# Patient Record
Sex: MALE | Race: WHITE | Employment: OTHER | URBAN - METROPOLITAN AREA
[De-identification: names, ages, dates, MRNs, and addresses within clinical notes are randomized per-mention and may not be internally consistent; named-entity substitution may affect disease eponyms.]

---

## 2017-04-03 ENCOUNTER — APPOINTMENT (OUTPATIENT)
Dept: GENERAL RADIOLOGY | Age: 67
End: 2017-04-03
Attending: NURSE PRACTITIONER
Payer: MEDICARE

## 2017-04-03 ENCOUNTER — HOSPITAL ENCOUNTER (EMERGENCY)
Age: 67
Discharge: HOME | End: 2017-04-03
Attending: EMERGENCY MEDICINE
Payer: MEDICARE

## 2017-04-03 ENCOUNTER — APPOINTMENT (OUTPATIENT)
Dept: CT IMAGING | Age: 67
End: 2017-04-03
Attending: NURSE PRACTITIONER
Payer: MEDICARE

## 2017-04-03 VITALS
HEART RATE: 105 BPM | TEMPERATURE: 98 F | RESPIRATION RATE: 16 BRPM | BODY MASS INDEX: 28.88 KG/M2 | OXYGEN SATURATION: 95 % | SYSTOLIC BLOOD PRESSURE: 171 MMHG | HEIGHT: 74 IN | DIASTOLIC BLOOD PRESSURE: 95 MMHG | WEIGHT: 225 LBS

## 2017-04-03 DIAGNOSIS — S32.000A COMPRESSION FRACTURE OF LUMBAR VERTEBRA, CLOSED, INITIAL ENCOUNTER (HCC): Primary | ICD-10-CM

## 2017-04-03 LAB
BACTERIA URNS QL MICRO: 0 /HPF
CASTS URNS QL MICRO: NORMAL /LPF
CRYSTALS URNS QL MICRO: 0 /LPF
EPI CELLS #/AREA URNS HPF: 0 /HPF
MUCOUS THREADS URNS QL MICRO: NORMAL /LPF
OTHER OBSERVATIONS,UCOM: NORMAL
RBC #/AREA URNS HPF: NORMAL /HPF
WBC URNS QL MICRO: NORMAL /HPF

## 2017-04-03 PROCEDURE — 72131 CT LUMBAR SPINE W/O DYE: CPT

## 2017-04-03 PROCEDURE — 99285 EMERGENCY DEPT VISIT HI MDM: CPT | Performed by: NURSE PRACTITIONER

## 2017-04-03 PROCEDURE — 81015 MICROSCOPIC EXAM OF URINE: CPT | Performed by: NURSE PRACTITIONER

## 2017-04-03 PROCEDURE — 72100 X-RAY EXAM L-S SPINE 2/3 VWS: CPT

## 2017-04-03 PROCEDURE — 74011250637 HC RX REV CODE- 250/637: Performed by: NURSE PRACTITIONER

## 2017-04-03 PROCEDURE — 81003 URINALYSIS AUTO W/O SCOPE: CPT | Performed by: NURSE PRACTITIONER

## 2017-04-03 RX ORDER — HYDROCODONE BITARTRATE AND ACETAMINOPHEN 5; 325 MG/1; MG/1
1 TABLET ORAL
Status: COMPLETED | OUTPATIENT
Start: 2017-04-03 | End: 2017-04-03

## 2017-04-03 RX ORDER — ATENOLOL 50 MG/1
50 TABLET ORAL 2 TIMES DAILY
COMMUNITY

## 2017-04-03 RX ORDER — ATORVASTATIN CALCIUM 10 MG/1
10 TABLET, FILM COATED ORAL DAILY
COMMUNITY

## 2017-04-03 RX ORDER — HYDROCODONE BITARTRATE AND ACETAMINOPHEN 5; 325 MG/1; MG/1
1 TABLET ORAL
Qty: 20 TAB | Refills: 0 | Status: SHIPPED | OUTPATIENT
Start: 2017-04-03 | End: 2017-04-27 | Stop reason: SDUPTHER

## 2017-04-03 RX ADMIN — HYDROCODONE BITARTRATE AND ACETAMINOPHEN 1 TABLET: 5; 325 TABLET ORAL at 14:00

## 2017-04-03 NOTE — DISCHARGE INSTRUCTIONS
Compression Fracture of the Spine: Care Instructions  Your Care Instructions    A compression fracture happens when the front part of a spinal bone breaks and collapses. A fall or other accident can cause it. A minor injury or moving the wrong way can cause a break if you have thin or brittle bones (osteoporosis). These types of breaks will heal in 8 to 10 weeks. You will need rest and pain medicines. Your doctor may recommend physical therapy. Some doctors recommend that certain people with compression fractures wear braces. Your doctor also may treat thin or brittle bones. You may need surgery if you have lasting pain or if the bone presses on the spinal cord or nerves. You heal best when you take good care of yourself. Eat a variety of healthy foods, and don't smoke. Follow-up care is a key part of your treatment and safety. Be sure to make and go to all appointments, and call your doctor if you are having problems. It's also a good idea to know your test results and keep a list of the medicines you take. How can you care for yourself at home? · Be safe with medicines. Read and follow all instructions on the label. ¨ If the doctor gave you a prescription medicine for pain, take it as prescribed. ¨ If you are not taking a prescription pain medicine, ask your doctor if you can take an over-the-counter medicine. · Talk to your doctor about how to make your bones stronger. You may need medicines or a change in what you eat. · Be active only as directed by your doctor. When should you call for help? Call 911 anytime you think you may need emergency care. For example, call if:  · You are unable to move an arm or a leg at all. Call your doctor now or seek immediate medical care if:  · You have new or worse symptoms in your arms, chest, legs, belly, or buttocks. Symptoms may include:  ¨ Numbness or tingling. ¨ Weakness. ¨ Pain. · You lose bladder or bowel control.   · You have belly pain, bloating, vomiting, or nausea. Watch closely for changes in your health, and be sure to contact your doctor if:  · You are not getting better as expected. Where can you learn more? Go to http://valentine-bijan.info/. Enter P445 in the search box to learn more about \"Compression Fracture of the Spine: Care Instructions. \"  Current as of: August 10, 2016  Content Version: 11.2  © 6681-6813 [employer]. Care instructions adapted under license by [employer] (which disclaims liability or warranty for this information). If you have questions about a medical condition or this instruction, always ask your healthcare professional. [address] any warranty or liability for your use of this information.

## 2017-04-03 NOTE — ED PROVIDER NOTES
HPI Comments: Patient presents with lower back pain that radiates into bilateral legs. He states pain started after he fell 2 weeks ago and landed on his buttocks. He states he has minimal problems with urinary retention. He states he has been constipated the past few days. He states he has not gotten out of the bed due to pain, but his daughter made him come to the ED today. He states he has been taking ibuprofen and tramadol which has not helped with pain. Patient is walking with a walker today due to pain. Patient is a 74 y.o. male presenting with back pain. The history is provided by the patient. Back Pain    This is a new problem. The current episode started more than 1 week ago. The problem has not changed since onset. The problem occurs constantly. Patient reports not work related injury. The pain is associated with walking. The pain is present in the lumbar spine. The quality of the pain is described as sharp. The pain radiates to the left thigh, left knee, right thigh and right knee. The pain is at a severity of 8/10. The pain is moderate. The symptoms are aggravated by certain positions. Associated symptoms include leg pain and weakness. Pertinent negatives include no chest pain, no fever, no numbness, no weight loss, no headaches, no abdominal pain, no abdominal swelling, no bowel incontinence, no perianal numbness, no bladder incontinence, no dysuria, no pelvic pain, no paresthesias, no paresis and no tingling. He has tried NSAIDs for the symptoms. Past Medical History:   Diagnosis Date    CAD (coronary artery disease)     Hypertension        History reviewed. No pertinent surgical history. History reviewed. No pertinent family history. Social History     Social History    Marital status: UNKNOWN     Spouse name: N/A    Number of children: N/A    Years of education: N/A     Occupational History    Not on file.      Social History Main Topics    Smoking status: Current Every Day Smoker    Smokeless tobacco: Not on file    Alcohol use Yes    Drug use: Not on file    Sexual activity: Not on file     Other Topics Concern    Not on file     Social History Narrative    No narrative on file         ALLERGIES: Review of patient's allergies indicates no known allergies. Review of Systems   Constitutional: Negative for fever and weight loss. Cardiovascular: Negative for chest pain. Gastrointestinal: Positive for constipation. Negative for abdominal pain, bowel incontinence, diarrhea, nausea and vomiting. Genitourinary: Positive for difficulty urinating. Negative for bladder incontinence, dysuria and pelvic pain. Musculoskeletal: Positive for back pain. Neurological: Positive for weakness. Negative for tingling, numbness, headaches and paresthesias. Visit Vitals    BP (!) 171/95 (BP 1 Location: Left arm, BP Patient Position: At rest)    Pulse (!) 105    Temp 98 °F (36.7 °C)    Resp 16    Ht 6' 2\" (1.88 m)    Wt 102.1 kg (225 lb)    SpO2 95%    BMI 28.89 kg/m2         Physical Exam   Constitutional: He is oriented to person, place, and time. He appears well-developed and well-nourished. No distress. Neck: Normal range of motion. Neck supple. Cardiovascular: Normal rate and regular rhythm. No murmur heard. Pulmonary/Chest: Effort normal and breath sounds normal. No respiratory distress. Abdominal: Soft. Normal appearance and bowel sounds are normal. He exhibits no distension. There is tenderness in the left upper quadrant. Musculoskeletal:        Thoracic back: Normal.        Lumbar back: He exhibits tenderness and pain. He exhibits normal pulse. Right upper leg: He exhibits tenderness. Left upper leg: He exhibits tenderness. Neurological: He is alert and oriented to person, place, and time. He has normal reflexes. He displays normal reflexes. No cranial nerve deficit. He exhibits normal muscle tone.  Coordination normal.   Skin: Skin is warm and dry. No rash noted. He is not diaphoretic. No erythema. Psychiatric: He has a normal mood and affect. His behavior is normal.   Nursing note and vitals reviewed. 5:53 PM-neurosurgery paged    6:14 PM-spoke with Dr. Barbara Corbett with neurosurgery. Patient is to be discharged home to follow up with Dr. Pavel Varela. Recent Results (from the past 12 hour(s))   URINE MICROSCOPIC    Collection Time: 04/03/17  2:51 PM   Result Value Ref Range    WBC 0-3 0 /hpf    RBC 0-3 0 /hpf    Epithelial cells 0 0 /hpf    Bacteria 0 0 /hpf    Casts 0-3 0 /lpf    Crystals 0 0 /LPF    Mucus TRACE 0 /lpf    Other observations RESULTS VERIFIED MANUALLY       CT SPINE LUMB WO CONT (Final result) Result time: 04/03/17 17:42:03     Final result by Eder Fry MD (04/03/17 17:42:03)     Impression:     IMPRESSION:  Acute L1 compression fracture.       Narrative:     CT LUMBAR SPINE WITHOUT CONTRAST. HISTORY: L1 compression fracture following fall 2012 days prior. TECHNIQUE: 3 mm axial scans from the pedicles of T12 into the sacrum with  sagittal and coronal reconstructions. Radiation dose reduction techniques were  used for this study.  Our CT scanners use one or more of the following:   Automated exposure control, adjustment of the mA and or kV according to patient  size, iterative reconstruction. COMPARISON:  Today's plain films reviewed. FINDINGS: Mackenzie Fisher is an acute L1 compression fracture. Posterior elements are  intact. No canal compromise. 60% of vertebral body height loss anteriorly. Included portion of the lung bases are clear. Included portion of the  retroperitoneum demonstrates dense aortic atherosclerosis. Acute stranding  densities anterior to the left iliopsoas inferiorly.  Urinary bladder appears  thick-walled although incompletely imaged.                 XR SPINE LUMB 2 OR 3 V (Final result) Result time: 04/03/17 14:56:13     Final result by Bailey Rodriguez MD (04/03/17 14:56:13)     Impression:   IMPRESSION: L1 compression deformity.     Narrative:     Lumbar spine    Clinical indication: 2 weeks of moderate to severe lumbar pain after fall injury      Comparison: none    Technique: 3 views    Findings: The bone density is overall low. There is a compression deformity at  L1 involving approximately 50% height loss of the mid and anterior vertebral  body. No evidence of displaced or retropulsed fragments. Vertebral heights are  otherwise maintained. There is moderate disc space narrowing at L5-S1. Small  multilevel degenerative osteophytes and mild facet arthropathic changes are  noted. There are scattered atherosclerotic calcifications of the aorta which is  tortuous. MDM  Number of Diagnoses or Management Options  Compression fracture of lumbar vertebra, closed, initial encounter: new and requires workup  Diagnosis management comments: Ct scan and xray positive for L1 compression fracture. Patient given PO norco for pain. He states pain is relieved. Patient given prescription for norco and referral for dr Bjorn Carey. Patient discharged home with family. UA negative for infection and blood.         Amount and/or Complexity of Data Reviewed  Clinical lab tests: ordered and reviewed  Tests in the radiology section of CPT®: ordered and reviewed  Tests in the medicine section of CPT®: reviewed and ordered      ED Course       Procedures

## 2017-04-03 NOTE — ED TRIAGE NOTES
Patient states he slipped and fell from 4ft, landing on his back. Patient c/o left flank, lower back pain and limited movement x 14 days.

## 2017-04-24 PROBLEM — I10 HYPERTENSION: Status: ACTIVE | Noted: 2017-04-24

## 2017-05-15 ENCOUNTER — HOSPITAL ENCOUNTER (OUTPATIENT)
Dept: PHYSICAL THERAPY | Age: 67
Discharge: HOME | End: 2017-05-15
Attending: NEUROLOGICAL SURGERY
Payer: MEDICARE

## 2017-05-15 DIAGNOSIS — S32.000A LUMBAR COMPRESSION FRACTURE, CLOSED, INITIAL ENCOUNTER (HCC): ICD-10-CM

## 2017-05-15 PROCEDURE — G8979 MOBILITY GOAL STATUS: HCPCS

## 2017-05-15 PROCEDURE — 97161 PT EVAL LOW COMPLEX 20 MIN: CPT

## 2017-05-15 PROCEDURE — G8978 MOBILITY CURRENT STATUS: HCPCS

## 2017-05-15 NOTE — PROGRESS NOTES
Ambulatory/Rehab Services H2 Model Falls Risk Assessment    Risk Factor Pts. ·   Confusion/Disorientation/Impulsivity  []    4 ·   Symptomatic Depression  []   2 ·   Altered Elimination  []   1 ·   Dizziness/Vertigo  []   1 ·   Gender (Male)  [x]   1 ·   Any administered antiepileptics (anticonvulsants):  []   2 ·   Any administered benzodiazepines:  []   1 ·   Visual Impairment (specify):  []   1 ·   Portable Oxygen Use  []   1 ·   Orthostatic ? BP  []   1 ·   History of Recent Falls (within 3 mos.)  [x]   5     Ability to Rise from Chair (choose one) Pts. ·   Ability to rise in a single movement  []   0 ·   Pushes up, successful in one attempt  [x]   1 ·   Multiple attempts, but successful  []   3 ·   Unable to rise without assistance  []   4   Total: (5 or greater = High Risk) 7     Falls Prevention Plan:   [x]                Physical Limitations to Exercise (specify): supervision and set up with exercises   []                Mobility Assistance Device (type):   [x]                Exercise/Equipment Adaptation (specify):balance training  ©2010 [facility] [address] [place] [address] States Patent #[id number].  Federal Law prohibits the replication, distribution or use without written permission from [employer] of [employer]

## 2017-05-15 NOTE — PROGRESS NOTES
Andrew Weston  : 1950 [facility]  [address], [address], [place] [address]  [phone]   [phone]        OUTPATIENT PHYSICAL THERAPY:Initial Assessment 5/15/2017    ICD-10: Treatment Diagnosis: Low back pain (M54.5)  Precautions/Allergies:   Review of patient's allergies indicates no known allergies. Fall Risk Score: 7 (? 5 = High Risk)  MD Orders: Evaluate and Treat MEDICAL/REFERRING DIAGNOSIS:  Lumbar compression fracture, closed, initial encounter [E28.744R]   DATE OF ONSET: 2017  REFERRING PHYSICIAN: Enoc Sherman MD  RETURN PHYSICIAN APPOINTMENT: none scheduled      INITIAL ASSESSMENT:  Mr. Lory Solis presents to physical therapy with an L1 compression fracture. Physical therapy evaluation demonstrates decreased strength, decreased balance, and increased SOB with mobility. Patient would benefit from skilled physical therapy to address his deficits and maximize his function. PROBLEM LIST (Impacting functional limitations):  1. Decreased Strength  2. Decreased ADL/Functional Activities  3. Decreased Ambulation Ability/Technique  4. Decreased Balance  5. Increased Pain  6. Decreased Activity Tolerance  7. Increased Shortness of Breath  8. Decreased Flexibility/Joint Mobility INTERVENTIONS PLANNED:  1. Bed Mobility  2. Cold  3. Electrical Stimulation  4. Family Education  5. Heat  6. Home Exercise Program (HEP)  7. Manual Therapy  8. Range of Motion (ROM)  9. Therapeutic Exercise/Strengthening   TREATMENT PLAN:  Effective Dates: 5-15-17 TO 8-15-17. Frequency/Duration: 1 times a week for 4 weeks  GOALS: (Goals have been discussed and agreed upon with patient.)  Short Term Goals  1. Pt will be independent with comprehensive HEP to centralize and reduce low back pain  2. Pt will participate in LE stretching program to increase flexibility  3. Pt will participate in core stabilization exercises to help with stabilization during ADLs  4.  Pt will participate in LE strengthening program with weights as appropriate to help with gait and elevations  5. Pt will participate in static and dynamic balance activities to decrease the risk for falls  6. Pt will demonstrate a 5 point decreased on the Modified Oswestry Low Back Disability Questionnaire to show improvement in function  Long Term Goals  1. Pt will demonstrate a 10 point decrease in the Modified Oswestry Low Back Disability Questionnaire to show improvement in function  2. Pt will demonstrate 5/5 LE strength on manual muscle testing  3. Pt will be able to perform SLS >5 seconds bilaterally to help with gait and improve balance  4. Pt will report <3/10 pain with sitting, standing, and walking to demonstrate improvement in function  Rehabilitation Potential For Stated Goals: Good  Regarding Lisa Lugo's therapy, I certify that the treatment plan above will be carried out by a therapist or under their direction. Thank you for this referral,  Gerry Cohn DPT     Referring Physician Signature: Enoc Sherman MD              Date                    HISTORY:   History of Present Injury/Illness (Reason for Referral):  Patient reports falling onto his butt and fractured his L1 vertebra. This occurred middle of March. Did not go to the MD for a couple of weeks. Went to the emergency room and was given X-rays which confirmed the fracture. Saw Dr. Juan Ramon Jaeger 3 weeks ago. Is non surgical. Was referred to pain management and physical therapy. Was given pain medicine. Has not been taking the pain medicine. Has been taking ibuprofen. Pain is better with lying down. Pain is worse with sitting, standing, walking. Has been wearing a lumbar corset (except for in bed). Pain is in the center of the back. Pain has been getting better. Pain is intermittent. Denies numbness/tingling. Has seen pain management. June 1st is his next visit with them and will be getting an injection.    Past Medical History/Comorbidities: Mr. Rohith Harrington  has a past medical history of CAD (coronary artery disease); Eczema (10/15/2015); Edema (2/2/2016); Hypercholesterolemia; and Hypertension. Mr. Rohith Harrington  has no past surgical history on file. Social History/Living Environment:    Live with wife. Two story home with walk in shower. Has been very careful with all ADLs. Does not exercise  Prior Level of Function/Work/Activity:  Retired. Dominant Side:         RIGHT  Other Clinical Tests:          X-rays, MRI- confirmed fracture      Current Medications:    Current Outpatient Prescriptions:     HYDROcodone-acetaminophen (NORCO) 5-325 mg per tablet, Take 2 Tabs by mouth every six (6) hours as needed for Pain. Max Daily Amount: 8 Tabs., Disp: 50 Tab, Rfl: 0    atenolol (TENORMIN) 25 mg tablet, Take 25 mg by mouth daily. , Disp: , Rfl:     lisinopril (PRINIVIL, ZESTRIL) 10 mg tablet, Take  by mouth daily. , Disp: , Rfl:     ibuprofen (MOTRIN) 600 mg tablet, Take  by mouth every six (6) hours as needed for Pain., Disp: , Rfl:     atenolol (TENORMIN) 50 mg tablet, Take 50 mg by mouth two (2) times a day. Indications: 50mg q am and 25mg q hs, Disp: , Rfl:     atorvastatin (LIPITOR) 10 mg tablet, Take 10 mg by mouth daily. , Disp: , Rfl:      Date Last Reviewed:  5/15/2017    Number of Personal Factors/Comorbidities that affect the Plan of Care: 0: LOW COMPLEXITY   EXAMINATION:   Observation/Orthostatic Postural Assessment:          Patient with lumbar corset. In sitting patient with slouched posture.  Decreased lumbar lordosis  Palpation:         Pain over L1  ROM:    Did not test lumbar ROM due to compression fracture  Hip ROM limited IR    Strength:     LE:  Hip Flexion 4/5 B  Hip Extension 4/5 B  Hip Abduction 4/5 B  Knee Flexion 4/5 B  Knee Extension 4/5 B  Ankle Dorsiflexion 5/5  Ankle Plantarflexion 5/5  Special Tests:     n/a  Neurological Screen:        Myotomes:  intact        Dermatomes:  intact  Functional Mobility:         Gait/Ambulation: Patient ambulates with increased trunk sway  Balance:          Single Leg balance- less than 2 seconds on R and L   Body Structures Involved:  1. Bones  2. Muscles Body Functions Affected:  1. Neuromusculoskeletal  2. Movement Related Activities and Participation Affected:  1. General Tasks and Demands  2. Mobility  3. Self Care  4. Domestic Life   Number of elements that affect the Plan of Care: 3: MODERATE COMPLEXITY   CLINICAL PRESENTATION:   Presentation: Stable and uncomplicated: LOW COMPLEXITY   CLINICAL DECISION MAKING:   Outcome Measure: Tool Used: Modified Oswestry Low Back Pain Questionnaire  Score:  Initial: 23/50  Most Recent: X/50 (Date: -- )   Interpretation of Score: Each section is scored on a 0-5 scale, 5 representing the greatest disability. The scores of each section are added together for a total score of 50. Score 0 1-10 11-20 21-30 31-40 41-49 50   Modifier CH CI CJ CK CL CM CN     ? Mobility - Walking and Moving Around:     - CURRENT STATUS: CK - 40%-59% impaired, limited or restricted    - GOAL STATUS: CJ - 20%-39% impaired, limited or restricted    - D/C STATUS:  ---------------To be determined---------------    Medical Necessity:   · Patient demonstrates good rehab potential due to higher previous functional level. Reason for Services/Other Comments:  · Patient continues to require skilled intervention due to pain limitng mobility. Use of outcome tool(s) and clinical judgement create a POC that gives a: Clear prediction of patient's progress: LOW COMPLEXITY   TREATMENT:   Pre-treatment Symptoms:  n/a  (In addition to Assessment/Re-Assessment sessions the following treatments were rendered)  THERAPEUTIC EXERCISE: ( minutes):  Exercises per grid below to improve mobility and strength. Required minimal visual, verbal and manual cues to promote proper body alignment and promote proper body posture. Progressed resistance, range and repetitions as indicated. Date:  5-15-17 Date:   Date:     Activity/Exercise Parameters Parameters Parameters   Patient Education Discussed POC, spinal precautions                                               Treatment/Session Assessment:  See assessment above. · Pain: Initial:    5 Post Session:   ·   Compliance with Program/Exercises: compliant all of the time. · Recommendations/Intent for next treatment session: \"Next visit will focus on advancements to more challenging activities and reduction in assistance provided\".   Future Appointments  Date Time Provider Ashanti Vance   5/24/2017 11:15 AM JOSE Yin [facility]   5/31/2017 11:15 AM JOSE Yin   6/7/2017 11:15 AM JOSE Yin [facility]     Total Treatment Duration:  PT Patient Time In/Time Out  Time In: 4510  Time Out: [address] DPT

## 2017-05-24 ENCOUNTER — HOSPITAL ENCOUNTER (OUTPATIENT)
Dept: PHYSICAL THERAPY | Age: 67
Discharge: HOME | End: 2017-05-24
Attending: NEUROLOGICAL SURGERY
Payer: MEDICARE

## 2017-05-24 PROCEDURE — 97110 THERAPEUTIC EXERCISES: CPT

## 2017-05-24 NOTE — PROGRESS NOTES
Jarrett Reagan  : 1950 [facility]  [address], [address], [place]  [phone]   [phone]        OUTPATIENT PHYSICAL THERAPY:Daily Note 2017    ICD-10: Treatment Diagnosis: Low back pain (M54.5)  Precautions/Allergies:   Review of patient's allergies indicates no known allergies. Fall Risk Score: 7 (? 5 = High Risk)  MD Orders: Evaluate and Treat MEDICAL/REFERRING DIAGNOSIS:  Lumbar compression fracture   DATE OF ONSET: 2017  REFERRING PHYSICIAN: Ailyn Muse MD  RETURN PHYSICIAN APPOINTMENT: none scheduled      INITIAL ASSESSMENT:  Mr. Dorian Mackay presents to physical therapy with an L1 compression fracture. Physical therapy evaluation demonstrates decreased strength, decreased balance, and increased SOB with mobility. Patient would benefit from skilled physical therapy to address his deficits and maximize his function. PROBLEM LIST (Impacting functional limitations):  1. Decreased Strength  2. Decreased ADL/Functional Activities  3. Decreased Ambulation Ability/Technique  4. Decreased Balance  5. Increased Pain  6. Decreased Activity Tolerance  7. Increased Shortness of Breath  8. Decreased Flexibility/Joint Mobility INTERVENTIONS PLANNED:  1. Bed Mobility  2. Cold  3. Electrical Stimulation  4. Family Education  5. Heat  6. Home Exercise Program (HEP)  7. Manual Therapy  8. Range of Motion (ROM)  9. Therapeutic Exercise/Strengthening   TREATMENT PLAN:  Effective Dates: 5-15-17 TO 8-15-17. Frequency/Duration: 1 times a week for 4 weeks  GOALS: (Goals have been discussed and agreed upon with patient.)  Short Term Goals  1. Pt will be independent with comprehensive HEP to centralize and reduce low back pain  2. Pt will participate in LE stretching program to increase flexibility  3. Pt will participate in core stabilization exercises to help with stabilization during ADLs  4.  Pt will participate in LE strengthening program with weights as appropriate to help with gait and elevations  5. Pt will participate in static and dynamic balance activities to decrease the risk for falls  6. Pt will demonstrate a 5 point decreased on the Modified Oswestry Low Back Disability Questionnaire to show improvement in function  Long Term Goals  1. Pt will demonstrate a 10 point decrease in the Modified Oswestry Low Back Disability Questionnaire to show improvement in function  2. Pt will demonstrate 5/5 LE strength on manual muscle testing  3. Pt will be able to perform SLS >5 seconds bilaterally to help with gait and improve balance  4. Pt will report <3/10 pain with sitting, standing, and walking to demonstrate improvement in function  Rehabilitation Potential For Stated Goals: Good  Regarding Leotha Holstein McGill's therapy, I certify that the treatment plan above will be carried out by a therapist or under their direction. Thank you for this referral,  Rome Lerner DPT     Referring Physician Signature: Ailyn Muse MD              Date                    HISTORY:   History of Present Injury/Illness (Reason for Referral):  Patient reports falling onto his butt and fractured his L1 vertebra. This occurred middle of March. Did not go to the MD for a couple of weeks. Went to the emergency room and was given X-rays which confirmed the fracture. Saw Dr. Yeny Milian 3 weeks ago. Is non surgical. Was referred to pain management and physical therapy. Was given pain medicine. Has not been taking the pain medicine. Has been taking ibuprofen. Pain is better with lying down. Pain is worse with sitting, standing, walking. Has been wearing a lumbar corset (except for in bed). Pain is in the center of the back. Pain has been getting better. Pain is intermittent. Denies numbness/tingling. Has seen pain management. June 1st is his next visit with them and will be getting an injection.    Past Medical History/Comorbidities:   Mr. Dorian Mackay  has a past medical history of CAD (coronary artery disease); Eczema (10/15/2015); Edema (2/2/2016); Hypercholesterolemia; and Hypertension. Mr. Pina Metcalf  has no past surgical history on file. Social History/Living Environment:    Live with wife. Two story home with walk in shower. Has been very careful with all ADLs. Does not exercise  Prior Level of Function/Work/Activity:  Retired. Dominant Side:         RIGHT  Other Clinical Tests:          X-rays, MRI- confirmed fracture      Current Medications:    Current Outpatient Prescriptions:     HYDROcodone-acetaminophen (NORCO) 5-325 mg per tablet, Take 2 Tabs by mouth every six (6) hours as needed for Pain. Max Daily Amount: 8 Tabs., Disp: 50 Tab, Rfl: 0    atenolol (TENORMIN) 25 mg tablet, Take 25 mg by mouth daily. , Disp: , Rfl:     lisinopril (PRINIVIL, ZESTRIL) 10 mg tablet, Take  by mouth daily. , Disp: , Rfl:     ibuprofen (MOTRIN) 600 mg tablet, Take  by mouth every six (6) hours as needed for Pain., Disp: , Rfl:     atenolol (TENORMIN) 50 mg tablet, Take 50 mg by mouth two (2) times a day. Indications: 50mg q am and 25mg q hs, Disp: , Rfl:     atorvastatin (LIPITOR) 10 mg tablet, Take 10 mg by mouth daily. , Disp: , Rfl:      Date Last Reviewed:  5/24/2017    EXAMINATION:   Observation/Orthostatic Postural Assessment:          Patient with lumbar corset. In sitting patient with slouched posture.  Decreased lumbar lordosis  Palpation:         Pain over L1  ROM:    Did not test lumbar ROM due to compression fracture  Hip ROM limited IR    Strength:     LE:  Hip Flexion 4/5 B  Hip Extension 4/5 B  Hip Abduction 4/5 B  Knee Flexion 4/5 B  Knee Extension 4/5 B  Ankle Dorsiflexion 5/5  Ankle Plantarflexion 5/5  Special Tests:     n/a  Neurological Screen:        Myotomes:  intact        Dermatomes:  intact  Functional Mobility:         Gait/Ambulation:  Patient ambulates with increased trunk sway  Balance:          Single Leg balance- less than 2 seconds on R and L   Body Structures Involved:  1. Bones  2. Muscles Body Functions Affected:  1. Neuromusculoskeletal  2. Movement Related Activities and Participation Affected:  1. General Tasks and Demands  2. Mobility  3. Self Care  4. Domestic Life   CLINICAL PRESENTATION:   CLINICAL DECISION MAKING:   Outcome Measure: Tool Used: Modified Oswestry Low Back Pain Questionnaire  Score:  Initial: 23/50  Most Recent: X/50 (Date: -- )   Interpretation of Score: Each section is scored on a 0-5 scale, 5 representing the greatest disability. The scores of each section are added together for a total score of 50. Score 0 1-10 11-20 21-30 31-40 41-49 50   Modifier CH CI CJ CK CL CM CN     ? Mobility - Walking and Moving Around:     - CURRENT STATUS: CK - 40%-59% impaired, limited or restricted    - GOAL STATUS: CJ - 20%-39% impaired, limited or restricted    - D/C STATUS:  ---------------To be determined---------------    Medical Necessity:   · Patient demonstrates good rehab potential due to higher previous functional level. Reason for Services/Other Comments:  · Patient continues to require skilled intervention due to pain limitng mobility. TREATMENT:   Pre-treatment Symptoms:  Patient reports he is the same  (In addition to Assessment/Re-Assessment sessions the following treatments were rendered)  THERAPEUTIC EXERCISE: ( 40 minutes):  Exercises per grid below to improve mobility and strength. Required minimal visual, verbal and manual cues to promote proper body alignment and promote proper body posture. Progressed resistance, range and repetitions as indicated.    Date:  5-15-17 Date:  5-24-17 Date:     Activity/Exercise Parameters Parameters Parameters   Patient Education Discussed POC, spinal precautions Updated HEP    Nu Step  Level 1 x 10 minutes    Iso Abdominal    5 sec hold x 10    Bridging    x15    Clamshells    x15 bilateral    Seated Marching    x15 bilateral    Prone Hip Extension    x15 bilateral    Mini Squats    x15 Treatment/Session Assessment:  Patient fatigues (muscular, activity tolerance) with all exercises today. · Pain: Initial:  Pain Intensity 1: 7 5 Post Session:   ·   Compliance with Program/Exercises: compliant all of the time. · Recommendations/Intent for next treatment session: \"Next visit will focus on advancements to more challenging activities and reduction in assistance provided\".   Future Appointments  Date Time Provider Ashanti Vance   5/31/2017 11:15 AM JOSE Aggarwal   6/7/2017 11:15 AM JOSE Aggarwal     Total Treatment Duration:  PT Patient Time In/Time Out  Time In: 1115  Time Out: 205 [facility]

## 2017-05-31 ENCOUNTER — HOSPITAL ENCOUNTER (OUTPATIENT)
Dept: PHYSICAL THERAPY | Age: 67
Discharge: HOME | End: 2017-05-31
Attending: NEUROLOGICAL SURGERY
Payer: MEDICARE

## 2017-05-31 PROCEDURE — 97110 THERAPEUTIC EXERCISES: CPT

## 2017-05-31 NOTE — PROGRESS NOTES
Amber Umaña  : 1950 [facility]  [address], [address], [place]  [phone]   [phone]        OUTPATIENT PHYSICAL THERAPY:Daily Note 2017    ICD-10: Treatment Diagnosis: Low back pain (M54.5)  Precautions/Allergies:   Review of patient's allergies indicates no known allergies. Fall Risk Score: 7 (? 5 = High Risk)  MD Orders: Evaluate and Treat MEDICAL/REFERRING DIAGNOSIS:  Lumbar compression fracture   DATE OF ONSET: 2017  REFERRING PHYSICIAN: Kirk Harden MD  RETURN PHYSICIAN APPOINTMENT: none scheduled      INITIAL ASSESSMENT:  Mr. Lucia Reese presents to physical therapy with an L1 compression fracture. Physical therapy evaluation demonstrates decreased strength, decreased balance, and increased SOB with mobility. Patient would benefit from skilled physical therapy to address his deficits and maximize his function. PROBLEM LIST (Impacting functional limitations):  1. Decreased Strength  2. Decreased ADL/Functional Activities  3. Decreased Ambulation Ability/Technique  4. Decreased Balance  5. Increased Pain  6. Decreased Activity Tolerance  7. Increased Shortness of Breath  8. Decreased Flexibility/Joint Mobility INTERVENTIONS PLANNED:  1. Bed Mobility  2. Cold  3. Electrical Stimulation  4. Family Education  5. Heat  6. Home Exercise Program (HEP)  7. Manual Therapy  8. Range of Motion (ROM)  9. Therapeutic Exercise/Strengthening   TREATMENT PLAN:  Effective Dates: 5-15-17 TO 8-15-17. Frequency/Duration: 1 times a week for 4 weeks  GOALS: (Goals have been discussed and agreed upon with patient.)  Short Term Goals  1. Pt will be independent with comprehensive HEP to centralize and reduce low back pain  2. Pt will participate in LE stretching program to increase flexibility  3. Pt will participate in core stabilization exercises to help with stabilization during ADLs  4.  Pt will participate in LE strengthening program with weights as appropriate to help with gait and elevations  5. Pt will participate in static and dynamic balance activities to decrease the risk for falls  6. Pt will demonstrate a 5 point decreased on the Modified Oswestry Low Back Disability Questionnaire to show improvement in function  Long Term Goals  1. Pt will demonstrate a 10 point decrease in the Modified Oswestry Low Back Disability Questionnaire to show improvement in function  2. Pt will demonstrate 5/5 LE strength on manual muscle testing  3. Pt will be able to perform SLS >5 seconds bilaterally to help with gait and improve balance  4. Pt will report <3/10 pain with sitting, standing, and walking to demonstrate improvement in function  Rehabilitation Potential For Stated Goals: Good  Regarding Tasha Lugo's therapy, I certify that the treatment plan above will be carried out by a therapist or under their direction. Thank you for this referral,  Bessie Menezes DPT     Referring Physician Signature: Kwaku Amador MD              Date                    HISTORY:   History of Present Injury/Illness (Reason for Referral):  Patient reports falling onto his butt and fractured his L1 vertebra. This occurred middle of March. Did not go to the MD for a couple of weeks. Went to the emergency room and was given X-rays which confirmed the fracture. Saw Dr. Med Mckinley 3 weeks ago. Is non surgical. Was referred to pain management and physical therapy. Was given pain medicine. Has not been taking the pain medicine. Has been taking ibuprofen. Pain is better with lying down. Pain is worse with sitting, standing, walking. Has been wearing a lumbar corset (except for in bed). Pain is in the center of the back. Pain has been getting better. Pain is intermittent. Denies numbness/tingling. Has seen pain management. June 1st is his next visit with them and will be getting an injection.    Past Medical History/Comorbidities:   Mr. Paulette Robertson  has a past medical history of CAD (coronary artery disease); Eczema (10/15/2015); Edema (2/2/2016); Hypercholesterolemia; and Hypertension. Mr. Rohith Harrington  has no past surgical history on file. Social History/Living Environment:    Live with wife. Two story home with walk in shower. Has been very careful with all ADLs. Does not exercise  Prior Level of Function/Work/Activity:  Retired. Dominant Side:         RIGHT  Other Clinical Tests:          X-rays, MRI- confirmed fracture      Current Medications:    Current Outpatient Prescriptions:     HYDROcodone-acetaminophen (NORCO) 5-325 mg per tablet, Take 2 Tabs by mouth every six (6) hours as needed for Pain. Max Daily Amount: 8 Tabs., Disp: 50 Tab, Rfl: 0    atenolol (TENORMIN) 25 mg tablet, Take 25 mg by mouth daily. , Disp: , Rfl:     lisinopril (PRINIVIL, ZESTRIL) 10 mg tablet, Take  by mouth daily. , Disp: , Rfl:     ibuprofen (MOTRIN) 600 mg tablet, Take  by mouth every six (6) hours as needed for Pain., Disp: , Rfl:     atenolol (TENORMIN) 50 mg tablet, Take 50 mg by mouth two (2) times a day. Indications: 50mg q am and 25mg q hs, Disp: , Rfl:     atorvastatin (LIPITOR) 10 mg tablet, Take 10 mg by mouth daily. , Disp: , Rfl:      Date Last Reviewed:  5/31/2017    EXAMINATION:   Observation/Orthostatic Postural Assessment:          Patient with lumbar corset. In sitting patient with slouched posture.  Decreased lumbar lordosis  Palpation:         Pain over L1  ROM:    Did not test lumbar ROM due to compression fracture  Hip ROM limited IR    Strength:     LE:  Hip Flexion 4/5 B  Hip Extension 4/5 B  Hip Abduction 4/5 B  Knee Flexion 4/5 B  Knee Extension 4/5 B  Ankle Dorsiflexion 5/5  Ankle Plantarflexion 5/5  Special Tests:     n/a  Neurological Screen:        Myotomes:  intact        Dermatomes:  intact  Functional Mobility:         Gait/Ambulation:  Patient ambulates with increased trunk sway  Balance:          Single Leg balance- less than 2 seconds on R and L   Body Structures Involved:  1. Bones  2. Muscles Body Functions Affected:  1. Neuromusculoskeletal  2. Movement Related Activities and Participation Affected:  1. General Tasks and Demands  2. Mobility  3. Self Care  4. Domestic Life   CLINICAL PRESENTATION:   CLINICAL DECISION MAKING:   Outcome Measure: Tool Used: Modified Oswestry Low Back Pain Questionnaire  Score:  Initial: 23/50  Most Recent: X/50 (Date: -- )   Interpretation of Score: Each section is scored on a 0-5 scale, 5 representing the greatest disability. The scores of each section are added together for a total score of 50. Score 0 1-10 11-20 21-30 31-40 41-49 50   Modifier CH CI CJ CK CL CM CN     ? Mobility - Walking and Moving Around:     - CURRENT STATUS: CK - 40%-59% impaired, limited or restricted    - GOAL STATUS: CJ - 20%-39% impaired, limited or restricted    - D/C STATUS:  ---------------To be determined---------------    Medical Necessity:   · Patient demonstrates good rehab potential due to higher previous functional level. Reason for Services/Other Comments:  · Patient continues to require skilled intervention due to pain limitng mobility. TREATMENT:   Pre-treatment Symptoms:  Patient reports he has been doing the exercises. Reports his back is a little more agitated the past couple ays  (In addition to Assessment/Re-Assessment sessions the following treatments were rendered)  THERAPEUTIC EXERCISE: ( 40 minutes):  Exercises per grid below to improve mobility and strength. Required minimal visual, verbal and manual cues to promote proper body alignment and promote proper body posture. Progressed resistance, range and repetitions as indicated.    Date:  5-15-17 Date:  5-24-17 Date:  5-31-17   Activity/Exercise Parameters Parameters Parameters   Patient Education Discussed POC, spinal precautions Updated HEP    Nu Step  Level 1 x 10 minutes Level 2 x 10 minutes   Iso Abdominal    5 sec hold x 10 5 sec hold x 10   Bridging    x15 x15 Clamshells    x15 bilateral x15 bilateral yellow TB   Seated Marching    x15 bilateral x15 bilateral   Prone Hip Extension    x15 bilateral x15 bilateral   Mini Squats    x15 x15   Step Ups     6\" step x 10 BLE   Side Step Ups   6\" step x 10 BLE   Sidestepping     50 feet x 2               Treatment/Session Assessment:  Patient with less fatigue today. Goes back to pain management tomorrow. Unsteady on feet today  · Pain: Initial:  Pain Intensity 1: 7 5 Post Session:   ·   Compliance with Program/Exercises: compliant all of the time. · Recommendations/Intent for next treatment session: \"Next visit will focus on advancements to more challenging activities and reduction in assistance provided\".   Future Appointments  Date Time Provider Ashanti Vance   5/31/2017 11:15 AM JOSE [facility]   6/7/2017 11:15 AM JOSE [facility]     Total Treatment Duration:  PT Patient Time In/Time Out  Time In: 1115  [facility]

## 2017-06-07 ENCOUNTER — HOSPITAL ENCOUNTER (OUTPATIENT)
Dept: PHYSICAL THERAPY | Age: 67
Discharge: HOME | End: 2017-06-07
Attending: NEUROLOGICAL SURGERY
Payer: MEDICARE

## 2017-06-07 PROCEDURE — 97110 THERAPEUTIC EXERCISES: CPT

## 2017-06-07 NOTE — PROGRESS NOTES
Cabrera Ortiz  : 1950 [facility]  [address], [address], [place]  [phone]   [phone]        OUTPATIENT PHYSICAL THERAPY:Daily Note 2017    ICD-10: Treatment Diagnosis: Low back pain (M54.5)  Precautions/Allergies:   Review of patient's allergies indicates no known allergies. Fall Risk Score: 7 (? 5 = High Risk)  MD Orders: Evaluate and Treat MEDICAL/REFERRING DIAGNOSIS:  Lumbar compression fracture   DATE OF ONSET: 2017  REFERRING PHYSICIAN: Sean gAuayo MD  RETURN PHYSICIAN APPOINTMENT: none scheduled      INITIAL ASSESSMENT:  Mr. Marlin Mendoza presents to physical therapy with an L1 compression fracture. Physical therapy evaluation demonstrates decreased strength, decreased balance, and increased SOB with mobility. Patient would benefit from skilled physical therapy to address his deficits and maximize his function. PROBLEM LIST (Impacting functional limitations):  1. Decreased Strength  2. Decreased ADL/Functional Activities  3. Decreased Ambulation Ability/Technique  4. Decreased Balance  5. Increased Pain  6. Decreased Activity Tolerance  7. Increased Shortness of Breath  8. Decreased Flexibility/Joint Mobility INTERVENTIONS PLANNED:  1. Bed Mobility  2. Cold  3. Electrical Stimulation  4. Family Education  5. Heat  6. Home Exercise Program (HEP)  7. Manual Therapy  8. Range of Motion (ROM)  9. Therapeutic Exercise/Strengthening   TREATMENT PLAN:  Effective Dates: 5-15-17 TO 8-15-17. Frequency/Duration: 1 times a week for 4 weeks  GOALS: (Goals have been discussed and agreed upon with patient.)  Short Term Goals  1. Pt will be independent with comprehensive HEP to centralize and reduce low back pain  2. Pt will participate in LE stretching program to increase flexibility  3. Pt will participate in core stabilization exercises to help with stabilization during ADLs  4.  Pt will participate in LE strengthening program with weights as appropriate to help with gait and elevations  5. Pt will participate in static and dynamic balance activities to decrease the risk for falls  6. Pt will demonstrate a 5 point decreased on the Modified Oswestry Low Back Disability Questionnaire to show improvement in function  Long Term Goals  1. Pt will demonstrate a 10 point decrease in the Modified Oswestry Low Back Disability Questionnaire to show improvement in function  2. Pt will demonstrate 5/5 LE strength on manual muscle testing  3. Pt will be able to perform SLS >5 seconds bilaterally to help with gait and improve balance  4. Pt will report <3/10 pain with sitting, standing, and walking to demonstrate improvement in function  Rehabilitation Potential For Stated Goals: Good  Regarding Navarro Lugo's therapy, I certify that the treatment plan above will be carried out by a therapist or under their direction. Thank you for this referral,  Mohit Roy DPT     Referring Physician Signature: Hector Flood MD              Date                    HISTORY:   History of Present Injury/Illness (Reason for Referral):  Patient reports falling onto his butt and fractured his L1 vertebra. This occurred middle of March. Did not go to the MD for a couple of weeks. Went to the emergency room and was given X-rays which confirmed the fracture. Saw Dr. Lisset Zhong 3 weeks ago. Is non surgical. Was referred to pain management and physical therapy. Was given pain medicine. Has not been taking the pain medicine. Has been taking ibuprofen. Pain is better with lying down. Pain is worse with sitting, standing, walking. Has been wearing a lumbar corset (except for in bed). Pain is in the center of the back. Pain has been getting better. Pain is intermittent. Denies numbness/tingling. Has seen pain management. June 1st is his next visit with them and will be getting an injection.    Past Medical History/Comorbidities:   Mr. Aundrea Savage  has a past medical history of CAD (coronary artery disease); Eczema (10/15/2015); Edema (2/2/2016); Hypercholesterolemia; and Hypertension. Mr. Vinnie Steele  has no past surgical history on file. Social History/Living Environment:    Live with wife. Two story home with walk in shower. Has been very careful with all ADLs. Does not exercise  Prior Level of Function/Work/Activity:  Retired. Dominant Side:         RIGHT  Other Clinical Tests:          X-rays, MRI- confirmed fracture      Current Medications:    Current Outpatient Prescriptions:     HYDROcodone-acetaminophen (NORCO) 5-325 mg per tablet, Take 2 Tabs by mouth every six (6) hours as needed for Pain. Max Daily Amount: 8 Tabs., Disp: 50 Tab, Rfl: 0    atenolol (TENORMIN) 25 mg tablet, Take 25 mg by mouth daily. , Disp: , Rfl:     lisinopril (PRINIVIL, ZESTRIL) 10 mg tablet, Take  by mouth daily. , Disp: , Rfl:     ibuprofen (MOTRIN) 600 mg tablet, Take  by mouth every six (6) hours as needed for Pain., Disp: , Rfl:     atenolol (TENORMIN) 50 mg tablet, Take 50 mg by mouth two (2) times a day. Indications: 50mg q am and 25mg q hs, Disp: , Rfl:     atorvastatin (LIPITOR) 10 mg tablet, Take 10 mg by mouth daily. , Disp: , Rfl:      Date Last Reviewed:  6/7/2017    EXAMINATION:   Observation/Orthostatic Postural Assessment:          Patient with lumbar corset. In sitting patient with slouched posture.  Decreased lumbar lordosis  Palpation:         Pain over L1  ROM:    Did not test lumbar ROM due to compression fracture  Hip ROM limited IR    Strength:     LE:  Hip Flexion 4/5 B  Hip Extension 4/5 B  Hip Abduction 4/5 B  Knee Flexion 4/5 B  Knee Extension 4/5 B  Ankle Dorsiflexion 5/5  Ankle Plantarflexion 5/5  Special Tests:     n/a  Neurological Screen:        Myotomes:  intact        Dermatomes:  intact  Functional Mobility:         Gait/Ambulation:  Patient ambulates with increased trunk sway  Balance:          Single Leg balance- less than 2 seconds on R and L   Body Structures Involved:  1. Bones  2. Muscles Body Functions Affected:  1. Neuromusculoskeletal  2. Movement Related Activities and Participation Affected:  1. General Tasks and Demands  2. Mobility  3. Self Care  4. Domestic Life   CLINICAL PRESENTATION:   CLINICAL DECISION MAKING:   Outcome Measure: Tool Used: Modified Oswestry Low Back Pain Questionnaire  Score:  Initial: 23/50  Most Recent: X/50 (Date: -- )   Interpretation of Score: Each section is scored on a 0-5 scale, 5 representing the greatest disability. The scores of each section are added together for a total score of 50. Score 0 1-10 11-20 21-30 31-40 41-49 50   Modifier CH CI CJ CK CL CM CN     ? Mobility - Walking and Moving Around:     - CURRENT STATUS: CK - 40%-59% impaired, limited or restricted    - GOAL STATUS: CJ - 20%-39% impaired, limited or restricted    - D/C STATUS:  ---------------To be determined---------------    Medical Necessity:   · Patient demonstrates good rehab potential due to higher previous functional level. Reason for Services/Other Comments:  · Patient continues to require skilled intervention due to pain limitng mobility. TREATMENT:   Pre-treatment Symptoms:  Patient reports his pain is better since he had the injection  (In addition to Assessment/Re-Assessment sessions the following treatments were rendered)  THERAPEUTIC EXERCISE: ( 40 minutes):  Exercises per grid below to improve mobility and strength. Required minimal visual, verbal and manual cues to promote proper body alignment and promote proper body posture. Progressed resistance, range and repetitions as indicated.    Date:  5-15-17 Date:  5-24-17 Date:  5-31-17 Date:  6-7-17   Activity/Exercise Parameters Parameters Parameters    Patient Education Discussed POC, spinal precautions Updated HEP     Nu Step  Level 1 x 10 minutes Level 2 x 10 minutes Level 2 x 10 minutes   Iso Abdominal    5 sec hold x 10 5 sec hold x 10 5 sec hold x 10   Bridging    x15 x15 x15   Clamshells    x15 bilateral x15 bilateral yellow TB x15 bilateral yellow TB   Seated Marching    x15 bilateral x15 bilateral x15 bilateral   Prone Hip Extension    x15 bilateral x15 bilateral x15 bilateral   Mini Squats    x15 x15 x15   Step Ups     6\" step x 10 BLE 6\" step x 15 BLE   Side Step Ups   6\" step x 10 BLE 6\" step x 15 BLE   Sidestepping     50 feet x 2 50 feet x 2   LTR      x10 each side         Treatment/Session Assessment:  Patient able to complete exercises without increased pain. Will schedule a few more visits to ensure maximum benefit. · Pain: Initial:  Pain Intensity 1: 7 5 Post Session:   ·   Compliance with Program/Exercises: compliant all of the time. · Recommendations/Intent for next treatment session: \"Next visit will focus on advancements to more challenging activities and reduction in assistance provided\".   Future Appointments  Date Time Provider Ashanti Vance   6/7/2017 11:15 AM Val Ruiz DPT [facility]     Total Treatment Duration:  PT Patient Time In/Time Out  Time In: 1115  [facility]

## 2017-06-14 NOTE — PROGRESS NOTES
Pretty Dinh  : 1950 [facility]  [address], [address], [place] [address]  [phone]   [phone]        OUTPATIENT PHYSICAL THERAPY:Discontinuation Summary 2017    ICD-10: Treatment Diagnosis: Low back pain (M54.5)  Precautions/Allergies:   Review of patient's allergies indicates no known allergies. Fall Risk Score: 7 (? 5 = High Risk)  MD Orders: Evaluate and Treat MEDICAL/REFERRING DIAGNOSIS:  Lumbar compression fracture   DATE OF ONSET: 2017  REFERRING PHYSICIAN: Meliza Chilel MD  RETURN PHYSICIAN APPOINTMENT: none scheduled      INITIAL ASSESSMENT:  Mr. Ene Lewis was seen for 4 physical therapy visits since his initial evaluation on May 15th, 2017. Patient called to cancel the remaining appointments stating he is feeling better. Will be discharged at this time. GOALS: (Goals have been discussed and agreed upon with patient.)  Short Term Goals Goals Not Met 17  1. Pt will be independent with comprehensive HEP to centralize and reduce low back pain  2. Pt will participate in LE stretching program to increase flexibility  3. Pt will participate in core stabilization exercises to help with stabilization during ADLs  4. Pt will participate in LE strengthening program with weights as appropriate to help with gait and elevations  5. Pt will participate in static and dynamic balance activities to decrease the risk for falls  6. Pt will demonstrate a 5 point decreased on the Modified Oswestry Low Back Disability Questionnaire to show improvement in function  Long Term Goals  1. Pt will demonstrate a 10 point decrease in the Modified Oswestry Low Back Disability Questionnaire to show improvement in function  2. Pt will demonstrate 5/5 LE strength on manual muscle testing  3. Pt will be able to perform SLS >5 seconds bilaterally to help with gait and improve balance  4.  Pt will report <3/10 pain with sitting, standing, and walking to demonstrate improvement in function              HISTORY:   History of Present Injury/Illness (Reason for Referral):  Patient reports falling onto his butt and fractured his L1 vertebra. This occurred middle of March. Did not go to the MD for a couple of weeks. Went to the emergency room and was given X-rays which confirmed the fracture. Saw Dr. Jeannine Fong 3 weeks ago. Is non surgical. Was referred to pain management and physical therapy. Was given pain medicine. Has not been taking the pain medicine. Has been taking ibuprofen. Pain is better with lying down. Pain is worse with sitting, standing, walking. Has been wearing a lumbar corset (except for in bed). Pain is in the center of the back. Pain has been getting better. Pain is intermittent. Denies numbness/tingling. Has seen pain management. June 1st is his next visit with them and will be getting an injection. Past Medical History/Comorbidities:   Mr. Dilan Cantor  has a past medical history of CAD (coronary artery disease); Eczema (10/15/2015); Edema (2/2/2016); Hypercholesterolemia; and Hypertension. Mr. Dilan Cantor  has no past surgical history on file. Social History/Living Environment:    Live with wife. Two story home with walk in shower. Has been very careful with all ADLs. Does not exercise  Prior Level of Function/Work/Activity:  Retired. Dominant Side:         RIGHT  Other Clinical Tests:          X-rays, MRI- confirmed fracture      Current Medications:    Current Outpatient Prescriptions:     HYDROcodone-acetaminophen (NORCO) 5-325 mg per tablet, Take 2 Tabs by mouth every six (6) hours as needed for Pain. Max Daily Amount: 8 Tabs., Disp: 50 Tab, Rfl: 0    atenolol (TENORMIN) 25 mg tablet, Take 25 mg by mouth daily. , Disp: , Rfl:     lisinopril (PRINIVIL, ZESTRIL) 10 mg tablet, Take  by mouth daily. , Disp: , Rfl:     ibuprofen (MOTRIN) 600 mg tablet, Take  by mouth every six (6) hours as needed for Pain., Disp: , Rfl:     atenolol (TENORMIN) 50 mg tablet, Take 50 mg by mouth two (2) times a day. Indications: 50mg q am and 25mg q hs, Disp: , Rfl:     atorvastatin (LIPITOR) 10 mg tablet, Take 10 mg by mouth daily. , Disp: , Rfl:      Date Last Reviewed:  6/14/2017    EXAMINATION:   Observation/Orthostatic Postural Assessment:          Patient with lumbar corset. In sitting patient with slouched posture. Decreased lumbar lordosis  Palpation:         Pain over L1  ROM:    Did not test lumbar ROM due to compression fracture  Hip ROM limited IR    Strength:     LE:  Hip Flexion 4/5 B  Hip Extension 4/5 B  Hip Abduction 4/5 B  Knee Flexion 4/5 B  Knee Extension 4/5 B  Ankle Dorsiflexion 5/5  Ankle Plantarflexion 5/5  Special Tests:     n/a  Neurological Screen:        Myotomes:  intact        Dermatomes:  intact  Functional Mobility:         Gait/Ambulation:  Patient ambulates with increased trunk sway  Balance:          Single Leg balance- less than 2 seconds on R and L   CLINICAL PRESENTATION:   CLINICAL DECISION MAKING:   Outcome Measure: Tool Used: Modified Oswestry Low Back Pain Questionnaire  Score:  Initial: 23/50  Most Recent: X/50 (Date: -- )   Interpretation of Score: Each section is scored on a 0-5 scale, 5 representing the greatest disability. The scores of each section are added together for a total score of 50. Score 0 1-10 11-20 21-30 31-40 41-49 50   Modifier CH CI CJ CK CL CM CN     ? Mobility - Walking and Moving Around:     - CURRENT STATUS: CK - 40%-59% impaired, limited or restricted    - GOAL STATUS: CJ - 20%-39% impaired, limited or restricted    - D/C STATUS:  ---------------To be determined---------------    ·    TREATMENT:   Pre-treatment Symptoms:    (In addition to Assessment/Re-Assessment sessions the following treatments were rendered)  THERAPEUTIC EXERCISE: ( ):  Exercises per grid below to improve mobility and strength.   Required minimal visual, verbal and manual cues to promote proper body alignment and promote proper body posture. Progressed resistance, range and repetitions as indicated.    Date:  5-15-17 Date:  5-24-17 Date:  5-31-17 Date:  6-7-17   Activity/Exercise Parameters Parameters Parameters    Patient Education Discussed POC, spinal precautions Updated HEP     Nu Step  Level 1 x 10 minutes Level 2 x 10 minutes Level 2 x 10 minutes   Iso Abdominal    5 sec hold x 10 5 sec hold x 10 5 sec hold x 10   Bridging    x15 x15 x15   Clamshells    x15 bilateral x15 bilateral yellow TB x15 bilateral yellow TB   Seated Marching    x15 bilateral x15 bilateral x15 bilateral   Prone Hip Extension    x15 bilateral x15 bilateral x15 bilateral   Mini Squats    x15 x15 x15   Step Ups     6\" step x 10 BLE 6\" step x 15 BLE   Side Step Ups   6\" step x 10 BLE 6\" step x 15 BLE   Sidestepping     50 feet x 2 50 feet x 2   LTR      x10 each side         Treatment/Session Assessment: See assessment above         Faheem Powers DPT

## 2017-06-15 ENCOUNTER — HOSPITAL ENCOUNTER (OUTPATIENT)
Dept: PHYSICAL THERAPY | Age: 67
Discharge: HOME | End: 2017-06-15
Attending: NEUROLOGICAL SURGERY
Payer: MEDICARE

## 2017-06-20 ENCOUNTER — APPOINTMENT (OUTPATIENT)
Dept: PHYSICAL THERAPY | Age: 67
End: 2017-06-20
Attending: NEUROLOGICAL SURGERY
Payer: MEDICARE

## 2017-06-28 ENCOUNTER — APPOINTMENT (OUTPATIENT)
Dept: PHYSICAL THERAPY | Age: 67
End: 2017-06-28
Attending: NEUROLOGICAL SURGERY
Payer: MEDICARE

## 2017-07-05 ENCOUNTER — APPOINTMENT (OUTPATIENT)
Dept: PHYSICAL THERAPY | Age: 67
End: 2017-07-05
Attending: NEUROLOGICAL SURGERY

## 2017-07-12 ENCOUNTER — APPOINTMENT (OUTPATIENT)
Dept: PHYSICAL THERAPY | Age: 67
End: 2017-07-12
Attending: NEUROLOGICAL SURGERY

## 2017-07-19 ENCOUNTER — APPOINTMENT (OUTPATIENT)
Dept: PHYSICAL THERAPY | Age: 67
End: 2017-07-19
Attending: NEUROLOGICAL SURGERY

## 2017-07-26 ENCOUNTER — APPOINTMENT (OUTPATIENT)
Dept: PHYSICAL THERAPY | Age: 67
End: 2017-07-26
Attending: NEUROLOGICAL SURGERY